# Patient Record
Sex: MALE | Race: WHITE | NOT HISPANIC OR LATINO | Employment: UNEMPLOYED | URBAN - METROPOLITAN AREA
[De-identification: names, ages, dates, MRNs, and addresses within clinical notes are randomized per-mention and may not be internally consistent; named-entity substitution may affect disease eponyms.]

---
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
## 2023-09-13 ENCOUNTER — ATHLETIC TRAINING (OUTPATIENT)
Dept: SPORTS MEDICINE | Facility: OTHER | Age: 18
End: 2023-09-13

## 2023-09-13 DIAGNOSIS — M53.80 BACK TIGHTNESS: Primary | ICD-10-CM

## 2023-09-14 NOTE — PROGRESS NOTES
AT Treatment      Subjective: Ath reports mid thoracic back pain.     Objective: See treatment diary below  Cat Cows  Cupping  Open Books  Steve Pose  Reach Throughs  Quad and Hip Flexor stretch    Assessment: Ath needs to work on thoracic and lumbar mobility    Plan: Continue AT treatments 2 times a week

## 2023-10-17 ENCOUNTER — ATHLETIC TRAINING (OUTPATIENT)
Dept: SPORTS MEDICINE | Facility: OTHER | Age: 18
End: 2023-10-17

## 2023-10-17 DIAGNOSIS — S76.312D HAMSTRING STRAIN, LEFT, SUBSEQUENT ENCOUNTER: Primary | ICD-10-CM

## 2023-10-17 DIAGNOSIS — S76.312D STRAIN OF LEFT HAMSTRING, SUBSEQUENT ENCOUNTER: Primary | ICD-10-CM

## 2023-10-17 NOTE — PROGRESS NOTES
Patient reports to [facility] on 10/16 for rehab. Patient states that his hamstring is feeling much better. Patient will attempt to complete team's assigned workout at practice but will only go at 75% max effort. Patient completed assigned rehab as documented on [facility] rehab sheet without issues.

## 2023-12-07 ENCOUNTER — ATHLETIC TRAINING (OUTPATIENT)
Dept: SPORTS MEDICINE | Facility: OTHER | Age: 18
End: 2023-12-07

## 2023-12-07 DIAGNOSIS — M62.89 MUSCLE TIGHTNESS: Primary | ICD-10-CM

## 2023-12-07 NOTE — PROGRESS NOTES
AT Treatment    Subjective: Ath reported tight upper back muscles. Objective: Ath has poor thoracic mobility. Ath did the following exercises:  Open the books  Quadriped Pull through's  Rhomboid table stretch  Stim and heat    Assessment: Tolerated treatment well. Patient exhibited good technique with therapeutic exercises      Plan: Continue per plan of care.      Ath will see chiro on Monday

## 2024-01-19 ENCOUNTER — ATHLETIC TRAINING (OUTPATIENT)
Dept: SPORTS MEDICINE | Facility: OTHER | Age: 19
End: 2024-01-19

## 2024-01-19 DIAGNOSIS — M53.80 BACK TIGHTNESS: Primary | ICD-10-CM

## 2024-01-22 ENCOUNTER — ATHLETIC TRAINING (OUTPATIENT)
Dept: SPORTS MEDICINE | Facility: OTHER | Age: 19
End: 2024-01-22

## 2024-01-22 DIAGNOSIS — M54.50 CHRONIC BILATERAL LOW BACK PAIN WITHOUT SCIATICA: Primary | ICD-10-CM

## 2024-01-22 DIAGNOSIS — G89.29 CHRONIC BILATERAL LOW BACK PAIN WITHOUT SCIATICA: Primary | ICD-10-CM

## 2024-01-22 NOTE — PROGRESS NOTES
Athletic Training Back Evaluation    Name: Khai Gay  Age: 18 y.o.   School District: [employer]  Sport: Track  Date of Assessment: 1/22/2024    Assessment/Plan:     Visit Diagnosis: LBP    Treatment Plan:     []  Follow-up PRN.   []  Follow-up prior to next practice/game for re-evaluation.  [x]  Daily treatment/rehab. Progress note expected weekly.     Referral:     [x]  Not needed at this time  []  Referred to:     []  Coaching staff notified  []  Parent/Guardian Notified    Subjective:    Date of Injury: 1/19/24    Injury occurred during:     [x]  Practice  []  Competition  []  Other:     Mechanism: None    Previous History: Tight hamstrings and upper back    Reported Symptoms:     [x] Pain with rest [] Numbness or tingling   [] Pain with activity [] Radiating pain   [] Pain with sleeping [] Weakness   [] Sharp pain [] Loss of motion   [x] Dull pain [] Twisted   [] Pressure [] Felt/heard a pop   [] Burning [] [place]/heard a crack     Objective:    Observation:     [x]  No observable findings compared bilaterally    [] Swelling [] Pelvic tilt   [] Deformity [] Spine curvature   [] Ecchymosis [] Winged scapula   [] Muscle spasm [] Abnormal posture   [] Abnormal gait [] Atrophy     Palpation: WNL    Active Range of Motion:      Full  ROM Limited  ROM Pain  with  ROM No  Motion   Trunk Flexion  [] [] [] []   Trunk Extension [] [] [] []   Lateral Flexion (Left) [] [] [] []   Lateral Flexion (Right) [] [] [] []   Trunk Rotation (Left) [] [] [] []   Trunk Rotation (Right) [] [] [] []   Hip Flexion [] [] [] []   Hip Extension [] [] [] []     Manual Muscle Tests:     Not performed [x]             5 4+ 4 4- 3 or  Under   Trunk Flexion  [] [] [] [] []   Trunk Extension [] [] [] [] []   Lateral Flexion (Left) [] [] [] [] []   Lateral Flexion (Right) [] [] [] [] []   Trunk Rotation (Left) [] [] [] [] []   Trunk Rotation (Right) [] [] [] [] []   Hip Flexion [] [] [] [] []   Hip Extension [] [] [] [] []     Special  Tests:      (+)  POS (-)  NEG Not  Tested   Spring Test [] [] [x]   Straight Leg Raise [] [] [x]   Valsalva [] [] [x]   Milgram's [] [] [x]   Kernig's/Brudzinski's []  [] [x]   Slump [] [] [x]   Quadrant [] [] [x]   Bowstring [] [] [x]   SI Compression/Distraction [] [] [x]   YUNI [] [] [x]   Long Sit Test [] [] [x]   Trendelenberg's  [] [] [x]   Rojas [] [] [x]     Lower Quarter Screen:  [x]  WNL  []  Abnormal    Treatment Log:     Date: 1/22/24   Playing Status: Cross Training       Exercise/Treatment    Quad Stretching    Hamstring Stretching    Self Snags    Open Books    Cat Cows    Steve Pose

## 2024-01-26 ENCOUNTER — ATHLETIC TRAINING (OUTPATIENT)
Dept: SPORTS MEDICINE | Facility: OTHER | Age: 19
End: 2024-01-26

## 2024-01-26 DIAGNOSIS — M54.31 RIGHT SCIATIC NERVE PAIN: Primary | ICD-10-CM

## 2024-01-26 NOTE — PROGRESS NOTES
Athletic Training Back Evaluation    Name: Khai Gay  Age: 18 y.o.   School District: [employer]  Sport: Track and field  Date of Assessment: 1/26/2024    Assessment/Plan:     Visit Diagnosis: No primary diagnosis found.    Treatment Plan:     []  Follow-up PRN.   []  Follow-up prior to next practice/game for re-evaluation.  [x]  Daily treatment/rehab. Progress note expected weekly.     Referral:     []  Not needed at this time  [x]  Referred to: PT    []  Coaching staff notified  []  Parent/Guardian Notified    Subjective:    Date of Injury: 1/19/24    Injury occurred during:     [x]  Practice  []  Competition  []  Other:     Mechanism: No KEITH    Previous History: n/a    Reported Symptoms:     [x] Pain with rest [x] Numbness or tingling   [x] Pain with activity [x] Radiating pain   [x] Pain with sleeping [] Weakness   [] Sharp pain [] Loss of motion   [] Dull pain [] Twisted   [] Pressure [] Felt/heard a pop   [] Burning [] [place]/heard a crack     Objective:    Observation:     [x]  No observable findings compared bilaterally    [] Swelling [] Pelvic tilt   [] Deformity [] Spine curvature   [] Ecchymosis [] Winged scapula   [] Muscle spasm [] Abnormal posture   [] Abnormal gait [] Atrophy     Palpation: No pain to palpation     Active Range of Motion:      Full  ROM Limited  ROM Pain  with  ROM No  Motion   Trunk Flexion  [] [] [x] []   Trunk Extension [] [x] [] []   Lateral Flexion (Left) [x] [] [] []   Lateral Flexion (Right) [x] [] [] []   Trunk Rotation (Left) [x] [] [] []   Trunk Rotation (Right) [x] [] [] []   Hip Flexion [x] [] [] []   Hip Extension [x] [] [] []     Manual Muscle Tests:     Not performed [x]             5 4+ 4 4- 3 or  Under   Trunk Flexion  [] [] [] [] []   Trunk Extension [] [] [] [] []   Lateral Flexion (Left) [] [] [] [] []   Lateral Flexion (Right) [] [] [] [] []   Trunk Rotation (Left) [] [] [] [] []   Trunk Rotation (Right) [] [] [] [] []   Hip Flexion [] [] [] [] []   Hip  Extension [] [] [] [] []     Special Tests:      (+)  POS (-)  NEG Not  Tested   Spring Test [] [] [x]   Straight Leg Raise [] [] [x]   Valsalva [] [] [x]   Milgram's [] [] [x]   Kernig's/Brusengzinski's []  [] [x]   Slump [] [] [x]   Quadrant [] [] [x]   Bowstring [] [] [x]   SI Compression/Distraction [] [] [x]   YUNI [] [] [x]   Long Sit Test [] [] [x]   Trendelenberg's  [] [] [x]   Rojas [] [] [x]     Lower Quarter Screen:  [x]  WNL  []  Abnormal    Treatment Log:     Date: 1/26/24   Playing Status: No participation        Exercise/Treatment    Sciatic nerve glides 20x   Piriformis stretch 2x:30   HS stretch 2x:30   Self SNAGS 20x

## 2024-02-01 ENCOUNTER — OFFICE VISIT (OUTPATIENT)
Dept: PHYSICAL THERAPY | Facility: OTHER | Age: 19
End: 2024-02-01

## 2024-02-01 DIAGNOSIS — M54.50 ACUTE LOW BACK PAIN WITHOUT SCIATICA, UNSPECIFIED BACK PAIN LATERALITY: Primary | ICD-10-CM

## 2024-02-01 PROCEDURE — 97161 PT EVAL LOW COMPLEX 20 MIN: CPT | Performed by: PHYSICAL THERAPIST

## 2024-02-02 ENCOUNTER — ATHLETIC TRAINING (OUTPATIENT)
Dept: SPORTS MEDICINE | Facility: OTHER | Age: 19
End: 2024-02-02

## 2024-02-02 DIAGNOSIS — M54.32 SCIATICA OF LEFT SIDE: Primary | ICD-10-CM

## 2024-02-02 NOTE — PROGRESS NOTES
Athletic Training Daily Note    Name: Khai Gay  Age: 18 y.o.     Visit Diagnosis: Right sciatica     Subjective: Patient reports that practice has been going well. Is excited to race this weekend and has been compliant with rehab and PT.     Objective: Continue plan of care.     Treatment Log:     Date: 1/26/24 2/2/24   Playing Status: No participation  Mod.        Exercise/Treatment     Sciatic nerve glides 20x 20x   Piriformis stretch 2x:30 2x:30   HS stretch 2x:30 2x:30   Self SNAGS 20x 20x   Massage   Low back

## 2024-02-04 NOTE — PROGRESS NOTES
PT Evaluation     Today's date: 2/3/2024  Patient name: Khai Gay  : 2005  MRN: [id number]  Referring provider: Juanito Alvarado, ISAI  Dx:   Encounter Diagnosis     ICD-10-CM    1. Acute low back pain without sciatica, unspecified back pain laterality  M54.50                      Assessment/Plan    Subjective    Objective           Precautions:       Manuals                                                                 Neuro Re-Ed                                                                                                        Ther Ex                                                                                                                     Ther Activity                                       Gait Training                                       Modalities

## 2024-02-05 ENCOUNTER — OFFICE VISIT (OUTPATIENT)
Dept: PHYSICAL THERAPY | Facility: OTHER | Age: 19
End: 2024-02-05

## 2024-02-05 DIAGNOSIS — M54.50 ACUTE LOW BACK PAIN WITHOUT SCIATICA, UNSPECIFIED BACK PAIN LATERALITY: Primary | ICD-10-CM

## 2024-02-05 PROCEDURE — 97140 MANUAL THERAPY 1/> REGIONS: CPT | Performed by: PHYSICAL THERAPIST

## 2024-02-06 ENCOUNTER — ATHLETIC TRAINING (OUTPATIENT)
Dept: SPORTS MEDICINE | Facility: OTHER | Age: 19
End: 2024-02-06

## 2024-02-06 DIAGNOSIS — M54.50 ACUTE LOW BACK PAIN WITHOUT SCIATICA, UNSPECIFIED BACK PAIN LATERALITY: Primary | ICD-10-CM

## 2024-02-07 ENCOUNTER — ATHLETIC TRAINING (OUTPATIENT)
Dept: SPORTS MEDICINE | Facility: OTHER | Age: 19
End: 2024-02-07

## 2024-02-07 DIAGNOSIS — M54.31 RIGHT SIDED SCIATICA: Primary | ICD-10-CM

## 2024-02-07 NOTE — PROGRESS NOTES
Athletic Training Daily Note    Name: Khai Gay  Age: 19 y.o.     Visit Diagnosis: right sciatica    Subjective: [facility] to [facility].    Objective:     Treatment Log:     Date: 1/26/24 2/2/24 2/6/24 2/7/24   Playing Status: No participation  Mod. full full          Exercise/Treatment       Sciatic nerve glides 20x 20x 20x 20x   Piriformis stretch 2x:30 2x:30 2x:30 2x:30   HS stretch 2x:30 2x:30 2x:30 2x:30   Self SNAGS 20x 20x 20x 20x   Massage   Low back

## 2024-02-11 NOTE — PROGRESS NOTES
PT Evaluation     Today's date: 2/10/2024  Patient name: Khai Gay  : 2005  MRN: [id number]  Referring provider: Juanito Alvarado, ISAI  Dx:   Encounter Diagnosis     ICD-10-CM    1. Acute low back pain without sciatica, unspecified back pain laterality  M54.50                      Assessment/Plan    Subjective    Objective           Precautions:       Manuals                                                                 Neuro Re-Ed                                                                                                        Ther Ex                                                                                                                     Ther Activity                                       Gait Training                                       Modalities

## 2024-02-12 ENCOUNTER — ATHLETIC TRAINING (OUTPATIENT)
Dept: SPORTS MEDICINE | Facility: OTHER | Age: 19
End: 2024-02-12

## 2024-02-12 DIAGNOSIS — M54.31 RIGHT SIDED SCIATICA: Primary | ICD-10-CM

## 2024-02-13 NOTE — PROGRESS NOTES
Athletic Training Daily Note    Name: Khai Gay  Age: 19 y.o.     Visit Diagnosis: right sciatica    Subjective: [facility] to [facility] for [facility]. Patient stated he felt very tight on Friday so he did not race. States he feels much better today and will give practice a try today.     Objective:     Treatment Log:     Date: 1/26/24 2/2/24 2/6/24 2/7/24 2/12/24   Playing Status: No participation  Mod. full full full           Exercise/Treatment        Sciatic nerve glides 20x 20x 20x 20x x   Piriformis stretch 2x:30 2x:30 2x:30 2x:30 x   HS stretch 2x:30 2x:30 2x:30 2x:30 x   Self SNAGS 20x 20x 20x 20x x   Massage   Low back   Cupping

## 2024-02-15 ENCOUNTER — ATHLETIC TRAINING (OUTPATIENT)
Dept: SPORTS MEDICINE | Facility: OTHER | Age: 19
End: 2024-02-15

## 2024-02-15 DIAGNOSIS — M54.31 RIGHT SIDED SCIATICA: Primary | ICD-10-CM

## 2024-02-15 NOTE — PROGRESS NOTES
Athletic Training Daily Note    Name: Khai Gay  Age: 19 y.o.     Visit Diagnosis: right sciatica    Subjective: [facility] to [facility] for [facility]. States he is feeling good during activity. Nothing to report symptomatically.     Objective:     Treatment Log:     Date: 1/26/24 2/2/24 2/6/24 2/7/24 2/12/24 2/15/24   Playing Status: No participation  Mod. full full full full            Exercise/Treatment         Sciatic nerve glides 20x 20x 20x 20x x x   Piriformis stretch 2x:30 2x:30 2x:30 2x:30 x x   HS stretch 2x:30 2x:30 2x:30 2x:30 x x   Self SNAGS 20x 20x 20x 20x x x   Massage   Low back   Cupping Massage

## 2024-05-02 ENCOUNTER — ATHLETIC TRAINING (OUTPATIENT)
Dept: SPORTS MEDICINE | Facility: OTHER | Age: 19
End: 2024-05-02

## 2024-05-02 DIAGNOSIS — M79.10 MUSCLE SORENESS: Primary | ICD-10-CM

## 2024-05-02 NOTE — PROGRESS NOTES
Athletic Training Daily Note    Name: Khai Gay  Age: 19 y.o.     Visit Diagnosis: muscle soreness    Subjective: [facility] reports to [facility] for [facility].    Objective:     Treatment Log:     Date: 1/26/24 2/2/24 2/6/24 2/7/24 2/12/24 2/15/24 5/2   Playing Status: No participation  Mod. full full full full full             Exercise/Treatment          Sciatic nerve glides 20x 20x 20x 20x x x Warm whirlpool   Piriformis stretch 2x:30 2x:30 2x:30 2x:30 x x    HS stretch 2x:30 2x:30 2x:30 2x:30 x x    Self SNAGS 20x 20x 20x 20x x x    Massage   Low back   Cupping Massage

## 2024-11-11 ENCOUNTER — ATHLETIC TRAINING (OUTPATIENT)
Dept: SPORTS MEDICINE | Facility: OTHER | Age: 19
End: 2024-11-11

## 2024-11-11 DIAGNOSIS — M62.89 MUSCLE TIGHTNESS: Primary | ICD-10-CM

## 2024-11-11 NOTE — PROGRESS NOTES
Athletic Training Daily Note    Name: Khai Gay  Age: 19 y.o.     Visit Diagnosis: Muscle tightness [M62.89]    Subjective: [facility] reports to [facility] for rehab of tight hamstrings    Objective:     Treatment Log:     MHP, self stretching, hamstring massage

## 2024-11-13 ENCOUNTER — ATHLETIC TRAINING (OUTPATIENT)
Dept: SPORTS MEDICINE | Facility: OTHER | Age: 19
End: 2024-11-13

## 2024-11-13 DIAGNOSIS — M62.89 MUSCLE TIGHTNESS: Primary | ICD-10-CM

## 2024-11-13 NOTE — PROGRESS NOTES
Athletic Training Daily Note    Name: Khai Gay  Age: 19 y.o.     Visit Diagnosis: hamstring muscle tightness    Subjective: [facility] reports to [facility] for [facility].    Objective:     Treatment Log:       Date: 11/13         Playing Status: full                   Exercise/Treatment MHP          Stretching          Nerve glides

## 2024-11-15 ENCOUNTER — ATHLETIC TRAINING (OUTPATIENT)
Dept: SPORTS MEDICINE | Facility: OTHER | Age: 19
End: 2024-11-15

## 2024-11-15 DIAGNOSIS — M62.89 MUSCLE TIGHTNESS: Primary | ICD-10-CM

## 2024-11-15 NOTE — PROGRESS NOTES
Athletic Training Daily Note    Name: Khai Gay  Age: 19 y.o.     Visit Diagnosis: right hamstring tightness    Subjective: [facility] reports to [facility] for [facility].    Objective:     Treatment Log:       Date: 11/13 11/15        Playing Status: full full                  Exercise/Treatment MHP Bike         Stretching Stretching         Nerve glides Heel digs          Standing HS curl          IASTM          Navid pro

## 2024-11-18 ENCOUNTER — ATHLETIC TRAINING (OUTPATIENT)
Dept: SPORTS MEDICINE | Facility: OTHER | Age: 19
End: 2024-11-18

## 2024-11-18 DIAGNOSIS — M62.89 MUSCLE TIGHTNESS: Primary | ICD-10-CM

## 2024-11-18 NOTE — PROGRESS NOTES
Athletic Training Daily Note    Name: Khai Gay  Age: 19 y.o.     Visit Diagnosis: Muscle tightness [M62.89]    Subjective: [facility] reports to [facility] for [facility].    Objective:     Treatment Log:     MHP, self stretching, Eccentric hammy curls manual resistance, SL RDL, [employer]

## 2024-11-20 ENCOUNTER — ATHLETIC TRAINING (OUTPATIENT)
Dept: SPORTS MEDICINE | Facility: OTHER | Age: 19
End: 2024-11-20

## 2024-11-20 DIAGNOSIS — M62.89 MUSCLE TIGHTNESS: Primary | ICD-10-CM
